# Patient Record
Sex: MALE | Race: WHITE | NOT HISPANIC OR LATINO
[De-identification: names, ages, dates, MRNs, and addresses within clinical notes are randomized per-mention and may not be internally consistent; named-entity substitution may affect disease eponyms.]

---

## 2017-01-20 ENCOUNTER — APPOINTMENT (OUTPATIENT)
Dept: WOUND CARE | Facility: CLINIC | Age: 72
End: 2017-01-20

## 2017-01-20 DIAGNOSIS — L89.153 PRESSURE ULCER OF SACRAL REGION, STAGE 3: ICD-10-CM

## 2017-01-20 DIAGNOSIS — G12.21 AMYOTROPHIC LATERAL SCLEROSIS: ICD-10-CM

## 2017-01-20 DIAGNOSIS — R32 UNSPECIFIED URINARY INCONTINENCE: ICD-10-CM

## 2017-01-20 DIAGNOSIS — Z74.01 BED CONFINEMENT STATUS: ICD-10-CM

## 2017-02-03 ENCOUNTER — APPOINTMENT (OUTPATIENT)
Dept: WOUND CARE | Facility: CLINIC | Age: 72
End: 2017-02-03

## 2017-02-21 ENCOUNTER — INPATIENT (INPATIENT)
Facility: HOSPITAL | Age: 72
LOS: 3 days | Discharge: HOME HEALTH | DRG: 689 | End: 2017-02-25
Attending: INTERNAL MEDICINE | Admitting: INTERNAL MEDICINE
Payer: MEDICARE

## 2017-02-21 VITALS
DIASTOLIC BLOOD PRESSURE: 87 MMHG | RESPIRATION RATE: 18 BRPM | HEART RATE: 111 BPM | SYSTOLIC BLOOD PRESSURE: 168 MMHG | OXYGEN SATURATION: 99 %

## 2017-02-21 DIAGNOSIS — N39.0 URINARY TRACT INFECTION, SITE NOT SPECIFIED: ICD-10-CM

## 2017-02-21 LAB
ALBUMIN SERPL ELPH-MCNC: 3.1 G/DL — LOW (ref 3.3–5)
ALP SERPL-CCNC: 77 U/L — SIGNIFICANT CHANGE UP (ref 40–120)
ALT FLD-CCNC: 15 U/L RC — SIGNIFICANT CHANGE UP (ref 10–45)
ANION GAP SERPL CALC-SCNC: 15 MMOL/L — SIGNIFICANT CHANGE UP (ref 5–17)
APPEARANCE UR: ABNORMAL
APTT BLD: 34.3 SEC — SIGNIFICANT CHANGE UP (ref 27.5–37.4)
AST SERPL-CCNC: 13 U/L — SIGNIFICANT CHANGE UP (ref 10–40)
BASOPHILS # BLD AUTO: 0 K/UL — SIGNIFICANT CHANGE UP (ref 0–0.2)
BASOPHILS NFR BLD AUTO: 0.3 % — SIGNIFICANT CHANGE UP (ref 0–2)
BILIRUB SERPL-MCNC: 0.2 MG/DL — SIGNIFICANT CHANGE UP (ref 0.2–1.2)
BILIRUB UR-MCNC: NEGATIVE — SIGNIFICANT CHANGE UP
BUN SERPL-MCNC: 20 MG/DL — SIGNIFICANT CHANGE UP (ref 7–23)
CALCIUM SERPL-MCNC: 9.4 MG/DL — SIGNIFICANT CHANGE UP (ref 8.4–10.5)
CHLORIDE SERPL-SCNC: 95 MMOL/L — LOW (ref 96–108)
CO2 SERPL-SCNC: 27 MMOL/L — SIGNIFICANT CHANGE UP (ref 22–31)
COLOR SPEC: YELLOW — SIGNIFICANT CHANGE UP
CREAT SERPL-MCNC: 0.79 MG/DL — SIGNIFICANT CHANGE UP (ref 0.5–1.3)
DIFF PNL FLD: ABNORMAL
EOSINOPHIL # BLD AUTO: 0.5 K/UL — SIGNIFICANT CHANGE UP (ref 0–0.5)
EOSINOPHIL NFR BLD AUTO: 3.8 % — SIGNIFICANT CHANGE UP (ref 0–6)
GAS PNL BLDV: SIGNIFICANT CHANGE UP
GLUCOSE SERPL-MCNC: 152 MG/DL — HIGH (ref 70–99)
GLUCOSE UR QL: NEGATIVE — SIGNIFICANT CHANGE UP
HCT VFR BLD CALC: 34.1 % — LOW (ref 39–50)
HGB BLD-MCNC: 10.8 G/DL — LOW (ref 13–17)
INR BLD: 1.29 RATIO — HIGH (ref 0.88–1.16)
KETONES UR-MCNC: NEGATIVE — SIGNIFICANT CHANGE UP
LEUKOCYTE ESTERASE UR-ACNC: ABNORMAL
LYMPHOCYTES # BLD AUTO: 1.6 K/UL — SIGNIFICANT CHANGE UP (ref 1–3.3)
LYMPHOCYTES # BLD AUTO: 13.3 % — SIGNIFICANT CHANGE UP (ref 13–44)
MCHC RBC-ENTMCNC: 24.9 PG — LOW (ref 27–34)
MCHC RBC-ENTMCNC: 31.7 GM/DL — LOW (ref 32–36)
MCV RBC AUTO: 78.7 FL — LOW (ref 80–100)
MONOCYTES # BLD AUTO: 1 K/UL — HIGH (ref 0–0.9)
MONOCYTES NFR BLD AUTO: 8.1 % — SIGNIFICANT CHANGE UP (ref 2–14)
NEUTROPHILS # BLD AUTO: 9.1 K/UL — HIGH (ref 1.8–7.4)
NEUTROPHILS NFR BLD AUTO: 74.5 % — SIGNIFICANT CHANGE UP (ref 43–77)
NITRITE UR-MCNC: POSITIVE
PH UR: 6 — SIGNIFICANT CHANGE UP (ref 4.8–8)
PLATELET # BLD AUTO: 336 K/UL — SIGNIFICANT CHANGE UP (ref 150–400)
POTASSIUM SERPL-MCNC: 4.2 MMOL/L — SIGNIFICANT CHANGE UP (ref 3.5–5.3)
POTASSIUM SERPL-SCNC: 4.2 MMOL/L — SIGNIFICANT CHANGE UP (ref 3.5–5.3)
PROT SERPL-MCNC: 8.2 G/DL — SIGNIFICANT CHANGE UP (ref 6–8.3)
PROT UR-MCNC: 100 MG/DL
PROTHROM AB SERPL-ACNC: 14 SEC — HIGH (ref 10–13.1)
RAPID RVP RESULT: SIGNIFICANT CHANGE UP
RBC # BLD: 4.33 M/UL — SIGNIFICANT CHANGE UP (ref 4.2–5.8)
RBC # FLD: 15.9 % — HIGH (ref 10.3–14.5)
SODIUM SERPL-SCNC: 137 MMOL/L — SIGNIFICANT CHANGE UP (ref 135–145)
SP GR SPEC: 1.01 — SIGNIFICANT CHANGE UP (ref 1.01–1.02)
UROBILINOGEN FLD QL: NEGATIVE — SIGNIFICANT CHANGE UP
WBC # BLD: 12.2 K/UL — HIGH (ref 3.8–10.5)
WBC # FLD AUTO: 12.2 K/UL — HIGH (ref 3.8–10.5)

## 2017-02-21 PROCEDURE — 93010 ELECTROCARDIOGRAM REPORT: CPT

## 2017-02-21 PROCEDURE — 99285 EMERGENCY DEPT VISIT HI MDM: CPT | Mod: 25

## 2017-02-21 PROCEDURE — 74177 CT ABD & PELVIS W/CONTRAST: CPT | Mod: 26

## 2017-02-21 PROCEDURE — 71010: CPT | Mod: 26

## 2017-02-21 PROCEDURE — 76700 US EXAM ABDOM COMPLETE: CPT | Mod: 26

## 2017-02-21 PROCEDURE — 76937 US GUIDE VASCULAR ACCESS: CPT | Mod: 26

## 2017-02-21 PROCEDURE — 36000 PLACE NEEDLE IN VEIN: CPT

## 2017-02-21 RX ORDER — SODIUM CHLORIDE 9 MG/ML
3 INJECTION INTRAMUSCULAR; INTRAVENOUS; SUBCUTANEOUS ONCE
Qty: 0 | Refills: 0 | Status: COMPLETED | OUTPATIENT
Start: 2017-02-21 | End: 2017-02-21

## 2017-02-21 RX ORDER — FAMOTIDINE 10 MG/ML
20 INJECTION INTRAVENOUS ONCE
Qty: 0 | Refills: 0 | Status: COMPLETED | OUTPATIENT
Start: 2017-02-21 | End: 2017-02-21

## 2017-02-21 RX ORDER — LIDOCAINE 4 G/100G
10 CREAM TOPICAL ONCE
Qty: 0 | Refills: 0 | Status: COMPLETED | OUTPATIENT
Start: 2017-02-21 | End: 2017-02-21

## 2017-02-21 RX ORDER — SODIUM CHLORIDE 9 MG/ML
500 INJECTION INTRAMUSCULAR; INTRAVENOUS; SUBCUTANEOUS ONCE
Qty: 0 | Refills: 0 | Status: COMPLETED | OUTPATIENT
Start: 2017-02-21 | End: 2017-02-21

## 2017-02-21 RX ORDER — CEFTRIAXONE 500 MG/1
1 INJECTION, POWDER, FOR SOLUTION INTRAMUSCULAR; INTRAVENOUS ONCE
Qty: 0 | Refills: 0 | Status: COMPLETED | OUTPATIENT
Start: 2017-02-21 | End: 2017-02-21

## 2017-02-21 RX ORDER — SODIUM CHLORIDE 9 MG/ML
1000 INJECTION INTRAMUSCULAR; INTRAVENOUS; SUBCUTANEOUS ONCE
Qty: 0 | Refills: 0 | Status: COMPLETED | OUTPATIENT
Start: 2017-02-21 | End: 2017-02-21

## 2017-02-21 RX ADMIN — SODIUM CHLORIDE 500 MILLILITER(S): 9 INJECTION INTRAMUSCULAR; INTRAVENOUS; SUBCUTANEOUS at 16:54

## 2017-02-21 RX ADMIN — SODIUM CHLORIDE 1000 MILLILITER(S): 9 INJECTION INTRAMUSCULAR; INTRAVENOUS; SUBCUTANEOUS at 20:04

## 2017-02-21 RX ADMIN — FAMOTIDINE 20 MILLIGRAM(S): 10 INJECTION INTRAVENOUS at 16:54

## 2017-02-21 RX ADMIN — SODIUM CHLORIDE 3 MILLILITER(S): 9 INJECTION INTRAMUSCULAR; INTRAVENOUS; SUBCUTANEOUS at 16:54

## 2017-02-21 RX ADMIN — Medication 30 MILLILITER(S): at 16:28

## 2017-02-21 RX ADMIN — LIDOCAINE 10 MILLILITER(S): 4 CREAM TOPICAL at 16:27

## 2017-02-21 RX ADMIN — CEFTRIAXONE 100 GRAM(S): 500 INJECTION, POWDER, FOR SOLUTION INTRAMUSCULAR; INTRAVENOUS at 19:38

## 2017-02-21 NOTE — ED ADULT NURSE NOTE - OBJECTIVE STATEMENT
Male 71 years old with history DM, HTN., and Multiple sclerosis was brought in by EMS from home for intermittent abdominal pain since November worse after eating. Pt is on Baclofen for MS but wife claimed that sometimes he refused to take it. Abdomen soft and non tender. Pt denies abdominal pain at this time. With [place] catheter attached to leg bag draining to cloudy urine. Buttocks, sacral and groin area very reddened and excoriated. With 6cm stage 3 at sacral area, blanching and with slight bleeding. Cleansed with saline and covered with  4x4. pt is quadriplegic. Has poor venous access. MD aware.

## 2017-02-21 NOTE — ED PROVIDER NOTE - SKIN, MLM
Large excoriations of b/l buttocks, blanching with stage III decubitus ulcer approx 5-6cm in diamtere

## 2017-02-21 NOTE — ED ADULT NURSE NOTE - PMH
DM (diabetes mellitus)    HTN (hypertension)    Incontinence, feces    Incontinent of urine    Multiple sclerosis    Pressure sore on sacrum    Quadriplegia

## 2017-02-21 NOTE — ED PROVIDER NOTE - PHYSICAL EXAMINATION
Attending Ferrer: Gen: nad, heent: atraumatic, eomi, perrla, mmm, neck: nttp, cv: rrr, soft murmur, lungs:Ctab, abd: soft, mild epigastric ttp, no rebound or guarding, ext: LE edema pitting, rectum, stage 3 decubitus ulcer with excoration and erythema, neuro: awake and following commands

## 2017-02-21 NOTE — ED PROVIDER NOTE - OBJECTIVE STATEMENT
71M hx of MS on prn baclofen (rarely used), HTN, CAD s/p stent x1, DM on glimepiride presents c/o generalized weakness associated with epigastric abdominal pain and intermittent subjective fever.  Pt and wife mention skin breakdown with sacral decubitus that occasional bleeds.  Pain worse with eating and described as "burning" that has been occurring intermittently x 3 months.  No nausea/vomiting, diarrhea/constipation. Chronic barton catheter. 71M hx of MS on prn baclofen (rarely used), HTN, CAD s/p stent x1, DM on glimepiride presents c/o generalized weakness associated with epigastric abdominal pain and intermittent subjective fever.  Pt and wife mention skin breakdown with sacral decubitus that occasional bleeds.  Pain worse with eating and described as "burning" that has been occurring intermittently x 3 months.  No nausea/vomiting, diarrhea/constipation. 71M hx of MS on prn baclofen (rarely used), HTN, CAD s/p stent x1, DM on glimepiride presents c/o generalized weakness associated with epigastric abdominal pain and intermittent subjective fever.  Pt and wife mention skin breakdown with sacral decubitus that occasional bleeds.  Pain worse with eating and described as "burning" that has been occurring intermittently x 3 months.  No nausea/vomiting, diarrhea/constipation.  Attending Ferrer: agree with above. additionally pt states pain in abdomen improved currently. no new medication. no recent abx use

## 2017-02-22 ENCOUNTER — TRANSCRIPTION ENCOUNTER (OUTPATIENT)
Age: 72
End: 2017-02-22

## 2017-02-22 ENCOUNTER — RESULT REVIEW (OUTPATIENT)
Age: 72
End: 2017-02-22

## 2017-02-22 DIAGNOSIS — L89.159 PRESSURE ULCER OF SACRAL REGION, UNSPECIFIED STAGE: ICD-10-CM

## 2017-02-22 DIAGNOSIS — E11.9 TYPE 2 DIABETES MELLITUS WITHOUT COMPLICATIONS: ICD-10-CM

## 2017-02-22 DIAGNOSIS — I82.90 ACUTE EMBOLISM AND THROMBOSIS OF UNSPECIFIED VEIN: ICD-10-CM

## 2017-02-22 DIAGNOSIS — N30.00 ACUTE CYSTITIS WITHOUT HEMATURIA: ICD-10-CM

## 2017-02-22 DIAGNOSIS — G35 MULTIPLE SCLEROSIS: ICD-10-CM

## 2017-02-22 DIAGNOSIS — R10.13 EPIGASTRIC PAIN: ICD-10-CM

## 2017-02-22 LAB
ANION GAP SERPL CALC-SCNC: 15 MMOL/L — SIGNIFICANT CHANGE UP (ref 5–17)
BASOPHILS # BLD AUTO: 0.02 K/UL — SIGNIFICANT CHANGE UP (ref 0–0.2)
BASOPHILS NFR BLD AUTO: 0.2 % — SIGNIFICANT CHANGE UP (ref 0–2)
BUN SERPL-MCNC: 14 MG/DL — SIGNIFICANT CHANGE UP (ref 7–23)
CALCIUM SERPL-MCNC: 8.5 MG/DL — SIGNIFICANT CHANGE UP (ref 8.4–10.5)
CHLORIDE SERPL-SCNC: 97 MMOL/L — SIGNIFICANT CHANGE UP (ref 96–108)
CO2 SERPL-SCNC: 24 MMOL/L — SIGNIFICANT CHANGE UP (ref 22–31)
CREAT SERPL-MCNC: 0.76 MG/DL — SIGNIFICANT CHANGE UP (ref 0.5–1.3)
EOSINOPHIL # BLD AUTO: 0.54 K/UL — HIGH (ref 0–0.5)
EOSINOPHIL NFR BLD AUTO: 5.4 % — SIGNIFICANT CHANGE UP (ref 0–6)
GLUCOSE SERPL-MCNC: 126 MG/DL — HIGH (ref 70–99)
HBA1C BLD-MCNC: 6.9 % — HIGH (ref 4–5.6)
HCT VFR BLD CALC: 29 % — LOW (ref 39–50)
HGB BLD-MCNC: 9.1 G/DL — LOW (ref 13–17)
IMM GRANULOCYTES NFR BLD AUTO: 0.3 % — SIGNIFICANT CHANGE UP (ref 0–1.5)
LYMPHOCYTES # BLD AUTO: 1.62 K/UL — SIGNIFICANT CHANGE UP (ref 1–3.3)
LYMPHOCYTES # BLD AUTO: 16.2 % — SIGNIFICANT CHANGE UP (ref 13–44)
MCHC RBC-ENTMCNC: 24.1 PG — LOW (ref 27–34)
MCHC RBC-ENTMCNC: 31.4 GM/DL — LOW (ref 32–36)
MCV RBC AUTO: 76.9 FL — LOW (ref 80–100)
MONOCYTES # BLD AUTO: 0.84 K/UL — SIGNIFICANT CHANGE UP (ref 0–0.9)
MONOCYTES NFR BLD AUTO: 8.4 % — SIGNIFICANT CHANGE UP (ref 2–14)
NEUTROPHILS # BLD AUTO: 6.98 K/UL — SIGNIFICANT CHANGE UP (ref 1.8–7.4)
NEUTROPHILS NFR BLD AUTO: 69.5 % — SIGNIFICANT CHANGE UP (ref 43–77)
PLATELET # BLD AUTO: 317 K/UL — SIGNIFICANT CHANGE UP (ref 150–400)
POTASSIUM SERPL-MCNC: 4 MMOL/L — SIGNIFICANT CHANGE UP (ref 3.5–5.3)
POTASSIUM SERPL-SCNC: 4 MMOL/L — SIGNIFICANT CHANGE UP (ref 3.5–5.3)
RBC # BLD: 3.77 M/UL — LOW (ref 4.2–5.8)
RBC # FLD: 16.6 % — HIGH (ref 10.3–14.5)
SODIUM SERPL-SCNC: 136 MMOL/L — SIGNIFICANT CHANGE UP (ref 135–145)
WBC # BLD: 10.03 K/UL — SIGNIFICANT CHANGE UP (ref 3.8–10.5)
WBC # FLD AUTO: 10.03 K/UL — SIGNIFICANT CHANGE UP (ref 3.8–10.5)

## 2017-02-22 PROCEDURE — 99232 SBSQ HOSP IP/OBS MODERATE 35: CPT

## 2017-02-22 PROCEDURE — 99223 1ST HOSP IP/OBS HIGH 75: CPT | Mod: AI

## 2017-02-22 RX ORDER — DEXTROSE 50 % IN WATER 50 %
12.5 SYRINGE (ML) INTRAVENOUS ONCE
Qty: 0 | Refills: 0 | Status: DISCONTINUED | OUTPATIENT
Start: 2017-02-22 | End: 2017-02-25

## 2017-02-22 RX ORDER — BACLOFEN 100 %
15 POWDER (GRAM) MISCELLANEOUS THREE TIMES A DAY
Qty: 0 | Refills: 0 | Status: DISCONTINUED | OUTPATIENT
Start: 2017-02-22 | End: 2017-02-25

## 2017-02-22 RX ORDER — POLYETHYLENE GLYCOL 3350 17 G/17G
17 POWDER, FOR SOLUTION ORAL DAILY
Qty: 0 | Refills: 0 | Status: DISCONTINUED | OUTPATIENT
Start: 2017-02-22 | End: 2017-02-25

## 2017-02-22 RX ORDER — ASCORBIC ACID 60 MG
500 TABLET,CHEWABLE ORAL DAILY
Qty: 0 | Refills: 0 | Status: DISCONTINUED | OUTPATIENT
Start: 2017-02-22 | End: 2017-02-25

## 2017-02-22 RX ORDER — ASPIRIN/CALCIUM CARB/MAGNESIUM 324 MG
81 TABLET ORAL DAILY
Qty: 0 | Refills: 0 | Status: DISCONTINUED | OUTPATIENT
Start: 2017-02-22 | End: 2017-02-25

## 2017-02-22 RX ORDER — CEFTRIAXONE 500 MG/1
1 INJECTION, POWDER, FOR SOLUTION INTRAMUSCULAR; INTRAVENOUS EVERY 24 HOURS
Qty: 0 | Refills: 0 | Status: DISCONTINUED | OUTPATIENT
Start: 2017-02-22 | End: 2017-02-24

## 2017-02-22 RX ORDER — SODIUM CHLORIDE 9 MG/ML
1000 INJECTION, SOLUTION INTRAVENOUS
Qty: 0 | Refills: 0 | Status: DISCONTINUED | OUTPATIENT
Start: 2017-02-22 | End: 2017-02-25

## 2017-02-22 RX ORDER — GLUCAGON INJECTION, SOLUTION 0.5 MG/.1ML
1 INJECTION, SOLUTION SUBCUTANEOUS ONCE
Qty: 0 | Refills: 0 | Status: DISCONTINUED | OUTPATIENT
Start: 2017-02-22 | End: 2017-02-25

## 2017-02-22 RX ORDER — ACETAMINOPHEN 500 MG
650 TABLET ORAL EVERY 6 HOURS
Qty: 0 | Refills: 0 | Status: DISCONTINUED | OUTPATIENT
Start: 2017-02-22 | End: 2017-02-25

## 2017-02-22 RX ORDER — CHOLECALCIFEROL (VITAMIN D3) 125 MCG
1000 CAPSULE ORAL DAILY
Qty: 0 | Refills: 0 | Status: DISCONTINUED | OUTPATIENT
Start: 2017-02-22 | End: 2017-02-25

## 2017-02-22 RX ORDER — LISINOPRIL 2.5 MG/1
2.5 TABLET ORAL DAILY
Qty: 0 | Refills: 0 | Status: DISCONTINUED | OUTPATIENT
Start: 2017-02-22 | End: 2017-02-25

## 2017-02-22 RX ORDER — SENNA PLUS 8.6 MG/1
2 TABLET ORAL AT BEDTIME
Qty: 0 | Refills: 0 | Status: DISCONTINUED | OUTPATIENT
Start: 2017-02-22 | End: 2017-02-25

## 2017-02-22 RX ORDER — PANTOPRAZOLE SODIUM 20 MG/1
40 TABLET, DELAYED RELEASE ORAL
Qty: 0 | Refills: 0 | Status: DISCONTINUED | OUTPATIENT
Start: 2017-02-22 | End: 2017-02-25

## 2017-02-22 RX ORDER — METOPROLOL TARTRATE 50 MG
25 TABLET ORAL
Qty: 0 | Refills: 0 | Status: DISCONTINUED | OUTPATIENT
Start: 2017-02-22 | End: 2017-02-25

## 2017-02-22 RX ORDER — INSULIN LISPRO 100/ML
VIAL (ML) SUBCUTANEOUS
Qty: 0 | Refills: 0 | Status: DISCONTINUED | OUTPATIENT
Start: 2017-02-22 | End: 2017-02-25

## 2017-02-22 RX ORDER — ENOXAPARIN SODIUM 100 MG/ML
40 INJECTION SUBCUTANEOUS EVERY 24 HOURS
Qty: 0 | Refills: 0 | Status: DISCONTINUED | OUTPATIENT
Start: 2017-02-22 | End: 2017-02-25

## 2017-02-22 RX ORDER — DOCUSATE SODIUM 100 MG
100 CAPSULE ORAL THREE TIMES A DAY
Qty: 0 | Refills: 0 | Status: DISCONTINUED | OUTPATIENT
Start: 2017-02-22 | End: 2017-02-25

## 2017-02-22 RX ORDER — DEXTROSE 50 % IN WATER 50 %
1 SYRINGE (ML) INTRAVENOUS ONCE
Qty: 0 | Refills: 0 | Status: DISCONTINUED | OUTPATIENT
Start: 2017-02-22 | End: 2017-02-25

## 2017-02-22 RX ADMIN — Medication 1000 UNIT(S): at 12:17

## 2017-02-22 RX ADMIN — Medication 25 MILLIGRAM(S): at 06:34

## 2017-02-22 RX ADMIN — PANTOPRAZOLE SODIUM 40 MILLIGRAM(S): 20 TABLET, DELAYED RELEASE ORAL at 06:34

## 2017-02-22 RX ADMIN — Medication 100 MILLIGRAM(S): at 06:34

## 2017-02-22 RX ADMIN — Medication 500 MILLIGRAM(S): at 12:17

## 2017-02-22 RX ADMIN — Medication 1: at 17:44

## 2017-02-22 RX ADMIN — Medication 25 MILLIGRAM(S): at 17:45

## 2017-02-22 RX ADMIN — CEFTRIAXONE 100 GRAM(S): 500 INJECTION, POWDER, FOR SOLUTION INTRAMUSCULAR; INTRAVENOUS at 22:51

## 2017-02-22 RX ADMIN — SENNA PLUS 2 TABLET(S): 8.6 TABLET ORAL at 22:51

## 2017-02-22 RX ADMIN — Medication 1 TABLET(S): at 12:17

## 2017-02-22 RX ADMIN — Medication 100 MILLIGRAM(S): at 22:51

## 2017-02-22 RX ADMIN — Medication 100 MILLIGRAM(S): at 16:37

## 2017-02-22 RX ADMIN — LISINOPRIL 2.5 MILLIGRAM(S): 2.5 TABLET ORAL at 06:34

## 2017-02-22 RX ADMIN — ENOXAPARIN SODIUM 40 MILLIGRAM(S): 100 INJECTION SUBCUTANEOUS at 12:19

## 2017-02-22 RX ADMIN — Medication 15 MILLIGRAM(S): at 22:51

## 2017-02-22 RX ADMIN — Medication 81 MILLIGRAM(S): at 12:17

## 2017-02-22 RX ADMIN — POLYETHYLENE GLYCOL 3350 17 GRAM(S): 17 POWDER, FOR SOLUTION ORAL at 12:16

## 2017-02-22 NOTE — DISCHARGE NOTE ADULT - HOSPITAL COURSE
attending to write 71M hx MS, htn, cad s/p pci, dm2 p/w generalized weakness, abdominal pain, subjective fever.    Dx: Acute cystitis /uti        Post prandial abdominal pain, likely dyspepsia, r/o PUD 71M hx MS, htn, cad s/p pci, dm2 p/w generalized weakness, abdominal pain, subjective fever.    Dx: Acute cystitis /uti        Post prandial abdominal pain, likely dyspepsia, r/o PUD  2/21 RVP negative         UA +  UTI   Ceftriaxone         CT A/P - Ascending right-sided urinary tract infection with mild associated right  hydroureteronephrosis.   1 cm cystic nodule within the body of the pancreas demonstrating minimal  if any interval increase in size since November 13, 2016. Dedicated abdominal MRI can be obtained for further evaluation. Prominent bilateral inguinal lymph nodes. Ultrasound can be performed for further evaluation.  2/22    Severe IAD, as per wound care team only Franco and triad cream for now(no nystatin)  2/23    S/P endoscopy, only gastritis             2/24     US done to re evaluate R hydro, resolved. DC Hirsch, apply condom cath              Ambulance was arranged by CM for tomorrows Pickup at 2 pm              DC home with PO cipro--

## 2017-02-22 NOTE — H&P ADULT. - PROBLEM SELECTOR PROBLEM 4
Prophylactic use of unfractionated heparin for venous thromboembolism Type 2 diabetes mellitus without complication, without long-term current use of insulin

## 2017-02-22 NOTE — DISCHARGE NOTE ADULT - PLAN OF CARE
[facility] CARE INSTRUCTIONS  f you were prescribed antibiotics, take them exactly as your caregiver instructs you. Finish the medication even if you feel better after you have only taken some of the medication.  Drink enough water and fluids to keep your urine clear or pale yellow.  Avoid caffeine, tea, and carbonated beverages. They tend to irritate your bladder.  Empty your bladder often. Avoid holding urine for long periods of time.  Empty your bladder before and after sexual intercourse.  After a bowel movement, women should cleanse from front to back. Use each tissue only once.  SEEK MEDICAL CARE IF:  You have back pain.  You develop a fever.  Your symptoms do not begin to resolve within 3 days.  SEEK IMMEDIATE MEDICAL CARE IF:  You have severe back pain or lower abdominal pain.  You develop chills.  You have nausea or vomiting.  You have continued burning or discomfort with urination. Right hydronephrosis was noted on us, repeat renal US done on 2/24 showed resolution. Hirsch catheter was discontinued. F/U with Urology Endoscopy showed gastritis Continue with your neurologist HgA1C this admission.  Make sure you get your HgA1c checked every three months.  If you take oral diabetes medications, check your blood glucose two times a day.  If you take insulin, check your blood glucose before meals and at bedtime.  It's important not to skip any meals.  Keep a log of your blood glucose results and always take it with you to your doctor appointments.  Keep a list of your current medications including injectables and over the counter medications and bring this medication list with you to all your doctor appointments.  If you have not seen your ophthalmologist this year call for appointment.  Check your feet daily for redness, sores, or openings. Do not self treat. If no improvement in two days call your primary care physician for an appointment.  Low blood sugar (hypoglycemia) is a blood sugar below 70mg/dl. Check your blood sugar if you feel signs/symptoms of hypoglycemia. If your blood sugar is below 70 take 15 grams of carbohydrates (ex 4 oz of apple juice, 3-4 glucose tablets, or 4-6 oz of regular soda) wait 15 minutes and repeat blood sugar to make sure it comes up above 70.  If your blood sugar is above 70 and you are due for a meal, have a meal.  If you are not due for a meal have a snack.  This snack helps keeps your blood sugar at a safe range. Endoscopy showed gastritis no H-pylori continue protonix and follow up with Dr Pat for management of chronic gastritis. Continue with your neurologist as outpatient , You were seen by Dr La neurologist. Telephone number for appointment if needed is listed below HgA1C this admission.  Make sure you get your HgA1c checked every three months.  If you take oral diabetes medications, check your blood glucose two times a day.  If you take insulin, check your blood glucose before meals and at bedtime.  It's important not to skip any meals.  Keep a log of your blood glucose results and always take it with you to your doctor appointments.  Keep a list of your current medications including injectables and over the counter medications and bring this medication list with you to all your doctor appointments.  If you have not seen your ophthalmologist this year call for appointment.  Check your feet daily for redness, sores, or openings. Do not self treat. If no improvement in two days call your primary care physician for an appointment.  Low blood sugar (hypoglycemia) is a blood sugar below 70mg/dl. Check your blood sugar if you feel signs/symptoms of hypoglycemia. If your blood sugar is below 70 take 15 grams of carbohydrates (ex 4 oz of apple juice, 3-4 glucose tablets, or 4-6 oz of regular soda) wait 15 minutes and repeat blood sugar to make sure it comes up above 70.  If your blood sugar is above 70 and you are due for a meal, have a meal.  If you are not due for a meal have a snack.  This snack helps keeps your blood sugar at a safe range. You were seen by Moises endocrinologist in the hospital for further outpatient care , please call for an appointment as listed below Right hydronephrosis was noted on us, repeat renal US done on 2/24 showed resolution. Hirsch catheter was discontinued. F/U with Urology ( Dr. Marquez)  condom catheter outpatient

## 2017-02-22 NOTE — H&P ADULT. - HISTORY OF PRESENT ILLNESS
71M hx MS, htn, cad s/p pci, dm2 p/w generalized weakness, abdominal pain, subjective fever.    ED VS: Tmax: 99.7, BP: 157-177/69-91, P: 100-107, R: 18, O2: 97-98% on RA  ED meds: ceftriaxone 1g iv, Ns x 1.5L, pepcid, maalox, viscous lidocaine Nighttime hospitalist, patient not previously known to me  Seen and examined on 2/21/17 at 1120pm    71M hx MS, htn, cad s/p pci, dm2 p/w generalized weakness, abdominal pain. history obtained from patient and wife. for the past 2-3 days the patient has experienced generalized weakness and decreased urination. he spiked a fever of 100.4 earlier today as well. denies cough, cp, sob, diarrhea, nausea, vomiting, chills, myalgias, congestion. also notes long history of abdominal pain and bloating related to eating solid foods. these symptoms have been occurring for many months and he has not seen a physician for these issues since it is hard to get out of the house. the abdominal pain occurs with all foods and typically occurs after eating along with bloating. denies melena, hematochezia, regular nsaid use, fhx gi cancer, dysphagia/odynaphagia. he had a colonoscopy 3 years ago which was normal per the patient however he has never had an endoscopy.    ED VS: Tmax: 99.7, BP: 157-177/69-91, P: 100-107, R: 18, O2: 97-98% on RA  ED meds: ceftriaxone 1g iv, Ns x 1.5L, pepcid, maalox, viscous lidocaine Nighttime hospitalist, patient not previously known to me    71M hx MS, htn, cad s/p pci, dm2 p/w generalized weakness, abdominal pain. history obtained from patient and wife. for the past 2-3 days the patient has experienced generalized weakness and decreased urination. he spiked a fever of 100.4 earlier today as well. denies cough, cp, sob, diarrhea, nausea, vomiting, chills, myalgias, congestion. also notes long history of abdominal pain and bloating related to eating solid foods. these symptoms have been occurring for many months and he has not seen a physician for these issues since it is hard to get out of the house. the abdominal pain occurs with all foods and typically occurs after eating along with bloating. denies melena, hematochezia, regular nsaid use, fhx gi cancer, dysphagia/odynaphagia. he had a colonoscopy 3 years ago which was normal per the patient however he has never had an endoscopy.    ED VS: Tmax: 99.7, BP: 157-177/69-91, P: 100-107, R: 18, O2: 97-98% on RA  ED meds: ceftriaxone 1g iv, Ns x 1.5L, pepcid, maalox, viscous lidocaine

## 2017-02-22 NOTE — DISCHARGE NOTE ADULT - PATIENT PORTAL LINK FT
“You can access the [employer] Patient Portal, offered by [facility], by registering with the following website: [email or web address]”

## 2017-02-22 NOTE — H&P ADULT. - RADIOLOGY RESULTS AND INTERPRETATION
CT A/P: CXR personally reviewed: Left lateral basilar opacity may represent trace left pleural effusion or atelectasis  CT A/P reviewed: Ascending right-sided urinary tract infection with mild associated right hydroureteronephrosis. 1 cm cystic nodule within the body of the pancreas demonstrating minimal  if any interval increase in size since November 13, 2016. Dedicated abdominal MRI can be obtained for further evaluation. Prominent bilateral inguinal lymph nodes. Ultrasound can be performed for  further evaluation.  RUQ sono reviewed: Cholelithiasis with negative sonographic Fernando's. Evaluation for   wall thickening and pericholecystic fluid is limited by acoustic  shadowing from gallstones. If clinical concern persists, HIDA scan can be  obtained for further evaluation.

## 2017-02-22 NOTE — H&P ADULT. - PROBLEM SELECTOR PLAN 6
lovenox  verify home meds in am lovenox  verify home meds in am    #Coronary artery disease  -self discontinued plavix in december  -c/w aspirin  -intolerance to lipitor, no longer taking  -requires outpt cardiology follow up

## 2017-02-22 NOTE — DISCHARGE NOTE ADULT - SECONDARY DIAGNOSIS.
Urinary retention Dyspepsia Multiple sclerosis Type 2 diabetes mellitus without complication, without long-term current use of insulin

## 2017-02-22 NOTE — DISCHARGE NOTE ADULT - PROVIDER TOKENS
TOKEN:'[id number]',TOKEN:'[id number]',TOKEN:'[id number]' TOKEN:'[id number]',TOKEN:'[id number]:'[id number]',TOKEN:'[id number]'

## 2017-02-22 NOTE — DISCHARGE NOTE ADULT - HOME CARE AGENCY
[place] [phone]. A visiting nurse will be visiting you in your home after you are discharged from the hospital.

## 2017-02-22 NOTE — DISCHARGE NOTE ADULT - CARE PROVIDERS DIRECT ADDRESSES
,DirectAddress_Unknown,DirectAddress_Unknown,DirectAddress_Unknown,DirectAddress_Unknown ,DirectAddress_Unknown,DirectAddress_Unknown,DirectAddress_Unknown,[email or web address],DirectAddress_Unknown

## 2017-02-22 NOTE — DISCHARGE NOTE ADULT - CARE PROVIDER_API CALL
Markus Zarco), Internal Medicine  [address]  [place], [place] [id number]  Phone: [phone]  Fax: [phone]    Selvin Pat), Medicine  [address] [place] [address]  Phone: [phone]  Fax: [phone]    Radha La), Neurology  [address] [place] [address]  Phone: [phone]  Fax: [phone] Markus Zarco), Internal Medicine  [address]  [place], [place] [id number]  Phone: [phone]  Fax: [phone]    Selvin Pat (MD), Medicine  [address] [place] [address]  Phone: [phone]  Fax: [phone]    Radha La (MD), Neurology  [address] [place] [address]  Phone: [phone]  Fax: [phone]    Selvin Marquez), Urology  [address]  [place] [address]  Phone: [phone]  Fax: [phone]

## 2017-02-22 NOTE — H&P ADULT. - PROBLEM SELECTOR PLAN 2
post-prandial abd pain, bloating likely 2/2 dyspepsia. r/o pud.  -start protonix daily  -GI consult to eval for egd

## 2017-02-22 NOTE — DISCHARGE NOTE ADULT - CARE PLAN
Principal Discharge DX:	UTI (urinary tract infection)  Secondary Diagnosis:	Urinary retention  Secondary Diagnosis:	Dyspepsia  Secondary Diagnosis:	Multiple sclerosis  Secondary Diagnosis:	Type 2 diabetes mellitus without complication, without long-term current use of insulin Principal Discharge DX:	UTI (urinary tract infection)  Goal:	Resolution  Instructions for follow-up, activity and diet:	HOME CARE INSTRUCTIONS  f you were prescribed antibiotics, take them exactly as your caregiver instructs you. Finish the medication even if you feel better after you have only taken some of the medication.  Drink enough water and fluids to keep your urine clear or pale yellow.  Avoid caffeine, tea, and carbonated beverages. They tend to irritate your bladder.  Empty your bladder often. Avoid holding urine for long periods of time.  Empty your bladder before and after sexual intercourse.  After a bowel movement, women should cleanse from front to back. Use each tissue only once.  SEEK MEDICAL CARE IF:  You have back pain.  You develop a fever.  Your symptoms do not begin to resolve within 3 days.  SEEK IMMEDIATE MEDICAL CARE IF:  You have severe back pain or lower abdominal pain.  You develop chills.  You have nausea or vomiting.  You have continued burning or discomfort with urination.  Secondary Diagnosis:	Urinary retention  Secondary Diagnosis:	Dyspepsia  Secondary Diagnosis:	Multiple sclerosis  Secondary Diagnosis:	Type 2 diabetes mellitus without complication, without long-term current use of insulin Principal Discharge DX:	UTI (urinary tract infection)  Goal:	Resolution  Instructions for follow-up, activity and diet:	HOME CARE INSTRUCTIONS  f you were prescribed antibiotics, take them exactly as your caregiver instructs you. Finish the medication even if you feel better after you have only taken some of the medication.  Drink enough water and fluids to keep your urine clear or pale yellow.  Avoid caffeine, tea, and carbonated beverages. They tend to irritate your bladder.  Empty your bladder often. Avoid holding urine for long periods of time.  Empty your bladder before and after sexual intercourse.  After a bowel movement, women should cleanse from front to back. Use each tissue only once.  SEEK MEDICAL CARE IF:  You have back pain.  You develop a fever.  Your symptoms do not begin to resolve within 3 days.  SEEK IMMEDIATE MEDICAL CARE IF:  You have severe back pain or lower abdominal pain.  You develop chills.  You have nausea or vomiting.  You have continued burning or discomfort with urination.  Secondary Diagnosis:	Urinary retention  Instructions for follow-up, activity and diet:	Right hydronephrosis was noted on us, repeat renal US done on 2/24 showed resolution. Hirsch catheter was discontinued. F/U with Urology  Secondary Diagnosis:	Dyspepsia  Instructions for follow-up, activity and diet:	Endoscopy showed gastritis  Secondary Diagnosis:	Multiple sclerosis  Instructions for follow-up, activity and diet:	Continue with your neurologist  Secondary Diagnosis:	Type 2 diabetes mellitus without complication, without long-term current use of insulin  Instructions for follow-up, activity and diet:	HgA1C this admission.  Make sure you get your HgA1c checked every three months.  If you take oral diabetes medications, check your blood glucose two times a day.  If you take insulin, check your blood glucose before meals and at bedtime.  It's important not to skip any meals.  Keep a log of your blood glucose results and always take it with you to your doctor appointments.  Keep a list of your current medications including injectables and over the counter medications and bring this medication list with you to all your doctor appointments.  If you have not seen your ophthalmologist this year call for appointment.  Check your feet daily for redness, sores, or openings. Do not self treat. If no improvement in two days call your primary care physician for an appointment.  Low blood sugar (hypoglycemia) is a blood sugar below 70mg/dl. Check your blood sugar if you feel signs/symptoms of hypoglycemia. If your blood sugar is below 70 take 15 grams of carbohydrates (ex 4 oz of apple juice, 3-4 glucose tablets, or 4-6 oz of regular soda) wait 15 minutes and repeat blood sugar to make sure it comes up above 70.  If your blood sugar is above 70 and you are due for a meal, have a meal.  If you are not due for a meal have a snack.  This snack helps keeps your blood sugar at a safe range. Principal Discharge DX:	UTI (urinary tract infection)  Goal:	Resolution  Instructions for follow-up, activity and diet:	HOME CARE INSTRUCTIONS  f you were prescribed antibiotics, take them exactly as your caregiver instructs you. Finish the medication even if you feel better after you have only taken some of the medication.  Drink enough water and fluids to keep your urine clear or pale yellow.  Avoid caffeine, tea, and carbonated beverages. They tend to irritate your bladder.  Empty your bladder often. Avoid holding urine for long periods of time.  Empty your bladder before and after sexual intercourse.  After a bowel movement, women should cleanse from front to back. Use each tissue only once.  SEEK MEDICAL CARE IF:  You have back pain.  You develop a fever.  Your symptoms do not begin to resolve within 3 days.  SEEK IMMEDIATE MEDICAL CARE IF:  You have severe back pain or lower abdominal pain.  You develop chills.  You have nausea or vomiting.  You have continued burning or discomfort with urination.  Secondary Diagnosis:	Urinary retention  Instructions for follow-up, activity and diet:	Right hydronephrosis was noted on us, repeat renal US done on 2/24 showed resolution. Hirsch catheter was discontinued. F/U with Urology  Secondary Diagnosis:	Dyspepsia  Instructions for follow-up, activity and diet:	Endoscopy showed gastritis no H-pylori continue protonix and follow up with Dr Pat for management of chronic gastritis.  Secondary Diagnosis:	Multiple sclerosis  Instructions for follow-up, activity and diet:	Continue with your neurologist as outpatient , You were seen by Dr La neurologist. Telephone number for appointment if needed is listed below  Secondary Diagnosis:	Type 2 diabetes mellitus without complication, without long-term current use of insulin  Instructions for follow-up, activity and diet:	HgA1C this admission.  Make sure you get your HgA1c checked every three months.  If you take oral diabetes medications, check your blood glucose two times a day.  If you take insulin, check your blood glucose before meals and at bedtime.  It's important not to skip any meals.  Keep a log of your blood glucose results and always take it with you to your doctor appointments.  Keep a list of your current medications including injectables and over the counter medications and bring this medication list with you to all your doctor appointments.  If you have not seen your ophthalmologist this year call for appointment.  Check your feet daily for redness, sores, or openings. Do not self treat. If no improvement in two days call your primary care physician for an appointment.  Low blood sugar (hypoglycemia) is a blood sugar below 70mg/dl. Check your blood sugar if you feel signs/symptoms of hypoglycemia. If your blood sugar is below 70 take 15 grams of carbohydrates (ex 4 oz of apple juice, 3-4 glucose tablets, or 4-6 oz of regular soda) wait 15 minutes and repeat blood sugar to make sure it comes up above 70.  If your blood sugar is above 70 and you are due for a meal, have a meal.  If you are not due for a meal have a snack.  This snack helps keeps your blood sugar at a safe range. You were seen by [facility] endocrinologist in the hospital for further outpatient care , please call for an appointment as listed below Principal Discharge DX:	UTI (urinary tract infection)  Goal:	Resolution  Instructions for follow-up, activity and diet:	HOME CARE INSTRUCTIONS  f you were prescribed antibiotics, take them exactly as your caregiver instructs you. Finish the medication even if you feel better after you have only taken some of the medication.  Drink enough water and fluids to keep your urine clear or pale yellow.  Avoid caffeine, tea, and carbonated beverages. They tend to irritate your bladder.  Empty your bladder often. Avoid holding urine for long periods of time.  Empty your bladder before and after sexual intercourse.  After a bowel movement, women should cleanse from front to back. Use each tissue only once.  SEEK MEDICAL CARE IF:  You have back pain.  You develop a fever.  Your symptoms do not begin to resolve within 3 days.  SEEK IMMEDIATE MEDICAL CARE IF:  You have severe back pain or lower abdominal pain.  You develop chills.  You have nausea or vomiting.  You have continued burning or discomfort with urination.  Secondary Diagnosis:	Urinary retention  Instructions for follow-up, activity and diet:	Right hydronephrosis was noted on us, repeat renal US done on 2/24 showed resolution. Hirsch catheter was discontinued. F/U with Urology ( Dr. Marquez)  condom catheter outpatient  Secondary Diagnosis:	Dyspepsia  Instructions for follow-up, activity and diet:	Endoscopy showed gastritis no H-pylori continue protonix and follow up with Dr Pat for management of chronic gastritis.  Secondary Diagnosis:	Multiple sclerosis  Instructions for follow-up, activity and diet:	Continue with your neurologist as outpatient , You were seen by Dr La neurologist. Telephone number for appointment if needed is listed below  Secondary Diagnosis:	Type 2 diabetes mellitus without complication, without long-term current use of insulin  Instructions for follow-up, activity and diet:	HgA1C this admission.  Make sure you get your HgA1c checked every three months.  If you take oral diabetes medications, check your blood glucose two times a day.  If you take insulin, check your blood glucose before meals and at bedtime.  It's important not to skip any meals.  Keep a log of your blood glucose results and always take it with you to your doctor appointments.  Keep a list of your current medications including injectables and over the counter medications and bring this medication list with you to all your doctor appointments.  If you have not seen your ophthalmologist this year call for appointment.  Check your feet daily for redness, sores, or openings. Do not self treat. If no improvement in two days call your primary care physician for an appointment.  Low blood sugar (hypoglycemia) is a blood sugar below 70mg/dl. Check your blood sugar if you feel signs/symptoms of hypoglycemia. If your blood sugar is below 70 take 15 grams of carbohydrates (ex 4 oz of apple juice, 3-4 glucose tablets, or 4-6 oz of regular soda) wait 15 minutes and repeat blood sugar to make sure it comes up above 70.  If your blood sugar is above 70 and you are due for a meal, have a meal.  If you are not due for a meal have a snack.  This snack helps keeps your blood sugar at a safe range. You were seen by [facility] endocrinologist in the hospital for further outpatient care , please call for an appointment as listed below

## 2017-02-22 NOTE — DISCHARGE NOTE ADULT - MEDICATION SUMMARY - MEDICATIONS TO STOP TAKING
I will STOP taking the medications listed below when I get home from the hospital:    Lasix 40 mg oral tablet  -- 1 tab(s) by mouth once a day

## 2017-02-22 NOTE — DISCHARGE NOTE ADULT - MEDICATION SUMMARY - MEDICATIONS TO TAKE
I will START or STAY ON the medications listed below when I get home from the hospital:    aquacel  -- Apply on skin to affected area once a day (or when soiled)  -- Indication: For Decubitus ulcer of sacral region, unspecified ulcer stage    Gel overlay to mattress of hospital bed  -- Apply on skin to affected area once a day    ICD   -- Indication: For Decubitus ulcer of sacral region, unspecified ulcer stage    aspirin 81 mg oral delayed release tablet  -- 1 tab(s) by mouth once a day  -- Indication: For cad    lisinopril 2.5 mg oral tablet  -- 1 tab(s) by mouth once a day  -- Indication: For htn    glimepiride 1 mg oral tablet  -- 1 tab(s) by mouth once a day  -- Indication: For Diabetes type 2    Metoprolol Tartrate 25 mg oral tablet  -- 1 tab(s) by mouth 2 times a day  -- Indication: For htn    polyethylene glycol 3350 oral powder for reconstitution  -- 17 gram(s) by mouth once a day  -- Indication: For constipation    senna oral tablet  -- 2 tab(s) by mouth once a day (at bedtime)  -- Indication: For constipation (over the counter medication)    baclofen 20 mg oral tablet  -- 1 tab(s) by mouth 3 times a day, As Needed  -- takes at bedtime daily and throughout the day as needed.   -- Indication: For spasms    ciprofloxacin 250 mg oral tablet  -- 1 tab(s) by mouth 2 times a day  -- Indication: For UTI (urinary tract infection)    Multiple Vitamins oral tablet  -- 1 tab(s) by mouth once a day  -- Indication: For supplement    ascorbic acid 500 mg oral tablet  -- 1 tab(s) by mouth once a day  -- Indication: For supplement    cholecalciferol oral tablet  -- 1000 unit(s) by mouth once a day  -- Indication: For supplement

## 2017-02-22 NOTE — H&P ADULT. - FAMILY HISTORY
<<-----Click on this checkbox to enter Family History Family history of colon cancer in mother     Father  Still living? Unknown  Family history of cerebrovascular accident (CVA) in father, Age at diagnosis: Age Unknown

## 2017-02-22 NOTE — H&P ADULT. - RS GEN PE MLT RESP DETAILS PC
respirations non-labored/clear to auscultation bilaterally/airway patent/good air movement/breath sounds equal

## 2017-02-22 NOTE — H&P ADULT. - PROBLEM SELECTOR PLAN 1
+UA a/w weakness, dec UOP.  -ceftriaxone 1g iv qd  -f/u ucx  -remove barton +UA a/w weakness, dec UOP. R hydronephrosis noted on CT and US.  -ceftriaxone 1g iv qd  -f/u ucx  -barton catheter in place

## 2017-02-23 LAB
-  AMIKACIN: SIGNIFICANT CHANGE UP
-  AMPICILLIN/SULBACTAM: SIGNIFICANT CHANGE UP
-  AMPICILLIN: SIGNIFICANT CHANGE UP
-  AZTREONAM: SIGNIFICANT CHANGE UP
-  CEFAZOLIN: SIGNIFICANT CHANGE UP
-  CEFEPIME: SIGNIFICANT CHANGE UP
-  CEFOXITIN: SIGNIFICANT CHANGE UP
-  CEFTAZIDIME: SIGNIFICANT CHANGE UP
-  CEFTRIAXONE: SIGNIFICANT CHANGE UP
-  CIPROFLOXACIN: SIGNIFICANT CHANGE UP
-  ERTAPENEM: SIGNIFICANT CHANGE UP
-  GENTAMICIN: SIGNIFICANT CHANGE UP
-  IMIPENEM: SIGNIFICANT CHANGE UP
-  LEVOFLOXACIN: SIGNIFICANT CHANGE UP
-  MEROPENEM: SIGNIFICANT CHANGE UP
-  NITROFURANTOIN: SIGNIFICANT CHANGE UP
-  PIPERACILLIN/TAZOBACTAM: SIGNIFICANT CHANGE UP
-  TOBRAMYCIN: SIGNIFICANT CHANGE UP
-  TRIMETHOPRIM/SULFAMETHOXAZOLE: SIGNIFICANT CHANGE UP
ANION GAP SERPL CALC-SCNC: 10 MMOL/L — SIGNIFICANT CHANGE UP (ref 5–17)
BUN SERPL-MCNC: 12 MG/DL — SIGNIFICANT CHANGE UP (ref 7–23)
CALCIUM SERPL-MCNC: 8.6 MG/DL — SIGNIFICANT CHANGE UP (ref 8.4–10.5)
CHLORIDE SERPL-SCNC: 100 MMOL/L — SIGNIFICANT CHANGE UP (ref 96–108)
CO2 SERPL-SCNC: 28 MMOL/L — SIGNIFICANT CHANGE UP (ref 22–31)
CREAT SERPL-MCNC: 0.7 MG/DL — SIGNIFICANT CHANGE UP (ref 0.5–1.3)
CULTURE RESULTS: SIGNIFICANT CHANGE UP
GLUCOSE SERPL-MCNC: 122 MG/DL — HIGH (ref 70–99)
HCT VFR BLD CALC: 31.1 % — LOW (ref 39–50)
HGB BLD-MCNC: 9.4 G/DL — LOW (ref 13–17)
MCHC RBC-ENTMCNC: 23.8 PG — LOW (ref 27–34)
MCHC RBC-ENTMCNC: 30.2 GM/DL — LOW (ref 32–36)
MCV RBC AUTO: 78.7 FL — LOW (ref 80–100)
METHOD TYPE: SIGNIFICANT CHANGE UP
ORGANISM # SPEC MICROSCOPIC CNT: SIGNIFICANT CHANGE UP
ORGANISM # SPEC MICROSCOPIC CNT: SIGNIFICANT CHANGE UP
PLATELET # BLD AUTO: 351 K/UL — SIGNIFICANT CHANGE UP (ref 150–400)
POTASSIUM SERPL-MCNC: 3.8 MMOL/L — SIGNIFICANT CHANGE UP (ref 3.5–5.3)
POTASSIUM SERPL-SCNC: 3.8 MMOL/L — SIGNIFICANT CHANGE UP (ref 3.5–5.3)
RBC # BLD: 3.95 M/UL — LOW (ref 4.2–5.8)
RBC # FLD: 16.7 % — HIGH (ref 10.3–14.5)
SODIUM SERPL-SCNC: 137 MMOL/L — SIGNIFICANT CHANGE UP (ref 135–145)
SPECIMEN SOURCE: SIGNIFICANT CHANGE UP
WBC # BLD: 10.64 K/UL — HIGH (ref 3.8–10.5)
WBC # FLD AUTO: 10.64 K/UL — HIGH (ref 3.8–10.5)

## 2017-02-23 PROCEDURE — 88312 SPECIAL STAINS GROUP 1: CPT | Mod: 26

## 2017-02-23 PROCEDURE — 88305 TISSUE EXAM BY PATHOLOGIST: CPT | Mod: 26

## 2017-02-23 RX ADMIN — POLYETHYLENE GLYCOL 3350 17 GRAM(S): 17 POWDER, FOR SOLUTION ORAL at 21:58

## 2017-02-23 RX ADMIN — Medication 100 MILLIGRAM(S): at 21:58

## 2017-02-23 RX ADMIN — LISINOPRIL 2.5 MILLIGRAM(S): 2.5 TABLET ORAL at 05:08

## 2017-02-23 RX ADMIN — PANTOPRAZOLE SODIUM 40 MILLIGRAM(S): 20 TABLET, DELAYED RELEASE ORAL at 05:08

## 2017-02-23 RX ADMIN — SENNA PLUS 2 TABLET(S): 8.6 TABLET ORAL at 21:58

## 2017-02-23 RX ADMIN — Medication 100 MILLIGRAM(S): at 05:08

## 2017-02-23 RX ADMIN — Medication 15 MILLIGRAM(S): at 22:50

## 2017-02-23 RX ADMIN — CEFTRIAXONE 100 GRAM(S): 500 INJECTION, POWDER, FOR SOLUTION INTRAMUSCULAR; INTRAVENOUS at 21:57

## 2017-02-23 RX ADMIN — Medication 25 MILLIGRAM(S): at 05:08

## 2017-02-23 RX ADMIN — Medication 15 MILLIGRAM(S): at 05:31

## 2017-02-23 RX ADMIN — Medication 25 MILLIGRAM(S): at 21:58

## 2017-02-23 NOTE — DIETITIAN INITIAL EVALUATION ADULT. - OTHER INFO
pt seen for consult for assessment. pt reports he has not been weighed recently but does not feel his clothing was fitting differently at home either. Noted pt's lowest wt during previous admission as per previous RD note from November 2016 was about 218 pounds and admit wt now of 213 pounds- a few pounds lower. pt reports NKFA. States he was taking multivitamin, and vitamin D at home. pt reports he stopped taking his vitamin C due to feeling indigestion and pain, states he also stopped taking his Plaxil on Christmas morning since it was affecting his appetite and causing him to have a metallic taste. pt reports he has not been having too many sources of animal protein either due to change in taste. Reports lately he has been having foods such as puddings, yogurt and gelatin at times. pt reports he is quadriplegic and does depend on his spouse for almost everything. Reports he was taking Glimepiride at home and his fasting Finger sticks are usually around 117-130. States since he has been having the pain with eating he has been avoiding spicy and fried foods.

## 2017-02-23 NOTE — DIETITIAN INITIAL EVALUATION ADULT. - NS AS NUTRI INTERV ED CONTENT
Discussed common gastric irritants c pt to avoid due to concerns for indigestion and reflux; noted pt pending EGD, discussed c pt, RD available to make further recommendations pending results. Discussed importance of adequate intake & consuming protein w/ all meals and snacks for optimal nutrition & glycemic control.

## 2017-02-23 NOTE — DIETITIAN INITIAL EVALUATION ADULT. - ORAL INTAKE PTA
fair/pt reports he usually has a good appetite but for the past several months, he has had some pain c eating so his portion sizes have decreased over time; typically meals are: breakfast: Lactaid milk c some chocolate syrup, eggs and toast; lunch: soup-which spouse will puree for him; dinner: various foods- takes mostly protein, starch and vegetables

## 2017-02-23 NOTE — DIETITIAN INITIAL EVALUATION ADULT. - ADHERENCE
consistent CHO diet; reports he tries to limit concentrated sweets but does "cheat" at times; reports spouse does not cook c salt at home and pt does not add salt at the table, at times will eat take out Chinese foods/fair

## 2017-02-23 NOTE — DIETITIAN INITIAL EVALUATION ADULT. - PROBLEM SELECTOR PLAN 1
+UA a/w weakness, dec UOP. R hydronephrosis noted on CT and US.  -ceftriaxone 1g iv qd  -f/u ucx  -barton catheter in place

## 2017-02-23 NOTE — DIETITIAN INITIAL EVALUATION ADULT. - FACTORS AFF FOOD INTAKE
pt reports he didn't have too much of an appetite when he first came in but since then it has improved; reports he has not had any breakfast today due to plan for EGD later today; pt reports no dentures, has some broken teeth which he needs to see a dentist for but hasn't been able to of late due to MS and weather conditions; pt denies any swallowing issues; reports he usually has BMs about twice weekly, has not had a BM since admission

## 2017-02-23 NOTE — DIETITIAN INITIAL EVALUATION ADULT. - PHYSICAL APPEARANCE
well nourished/nutrition focused physical exam conducted; pt c mild muscle loss around shoulders- however, pt was not in the optimal position for nutrition focused physical exam; no muscle loss noted around temples; no fat loss noted except for slight loss around orbital region

## 2017-02-23 NOTE — DIETITIAN INITIAL EVALUATION ADULT. - PROBLEM SELECTOR PLAN 6
lovenox  verify home meds in am    #Coronary artery disease  -self discontinued plavix in december  -c/w aspirin  -intolerance to lipitor, no longer taking  -requires outpt cardiology follow up

## 2017-02-24 LAB — SURGICAL PATHOLOGY STUDY: SIGNIFICANT CHANGE UP

## 2017-02-24 PROCEDURE — 76775 US EXAM ABDO BACK WALL LIM: CPT | Mod: 26

## 2017-02-24 RX ORDER — CIPROFLOXACIN LACTATE 400MG/40ML
250 VIAL (ML) INTRAVENOUS
Qty: 0 | Refills: 0 | Status: DISCONTINUED | OUTPATIENT
Start: 2017-02-24 | End: 2017-02-25

## 2017-02-24 RX ADMIN — Medication 100 MILLIGRAM(S): at 13:10

## 2017-02-24 RX ADMIN — Medication 100 MILLIGRAM(S): at 23:12

## 2017-02-24 RX ADMIN — Medication 1 TABLET(S): at 11:48

## 2017-02-24 RX ADMIN — Medication 25 MILLIGRAM(S): at 17:46

## 2017-02-24 RX ADMIN — Medication 1000 UNIT(S): at 11:47

## 2017-02-24 RX ADMIN — Medication 250 MILLIGRAM(S): at 17:46

## 2017-02-24 RX ADMIN — POLYETHYLENE GLYCOL 3350 17 GRAM(S): 17 POWDER, FOR SOLUTION ORAL at 11:48

## 2017-02-24 RX ADMIN — Medication 1: at 08:12

## 2017-02-24 RX ADMIN — Medication 15 MILLIGRAM(S): at 23:13

## 2017-02-24 RX ADMIN — Medication 100 MILLIGRAM(S): at 05:46

## 2017-02-24 RX ADMIN — Medication 15 MILLIGRAM(S): at 05:46

## 2017-02-24 RX ADMIN — SENNA PLUS 2 TABLET(S): 8.6 TABLET ORAL at 23:12

## 2017-02-24 RX ADMIN — PANTOPRAZOLE SODIUM 40 MILLIGRAM(S): 20 TABLET, DELAYED RELEASE ORAL at 05:46

## 2017-02-24 RX ADMIN — LISINOPRIL 2.5 MILLIGRAM(S): 2.5 TABLET ORAL at 05:47

## 2017-02-24 RX ADMIN — Medication 25 MILLIGRAM(S): at 05:46

## 2017-02-24 RX ADMIN — ENOXAPARIN SODIUM 40 MILLIGRAM(S): 100 INJECTION SUBCUTANEOUS at 11:48

## 2017-02-24 RX ADMIN — Medication 1: at 17:46

## 2017-02-24 RX ADMIN — Medication 81 MILLIGRAM(S): at 11:47

## 2017-02-25 VITALS — HEART RATE: 70 BPM | SYSTOLIC BLOOD PRESSURE: 144 MMHG | DIASTOLIC BLOOD PRESSURE: 76 MMHG

## 2017-02-25 PROCEDURE — 76775 US EXAM ABDO BACK WALL LIM: CPT

## 2017-02-25 PROCEDURE — 82803 BLOOD GASES ANY COMBINATION: CPT

## 2017-02-25 PROCEDURE — 87798 DETECT AGENT NOS DNA AMP: CPT

## 2017-02-25 PROCEDURE — 99285 EMERGENCY DEPT VISIT HI MDM: CPT | Mod: 25

## 2017-02-25 PROCEDURE — 96375 TX/PRO/DX INJ NEW DRUG ADDON: CPT | Mod: XU

## 2017-02-25 PROCEDURE — 83036 HEMOGLOBIN GLYCOSYLATED A1C: CPT

## 2017-02-25 PROCEDURE — 80048 BASIC METABOLIC PNL TOTAL CA: CPT

## 2017-02-25 PROCEDURE — 36000 PLACE NEEDLE IN VEIN: CPT | Mod: LT,XU

## 2017-02-25 PROCEDURE — 88305 TISSUE EXAM BY PATHOLOGIST: CPT

## 2017-02-25 PROCEDURE — 87633 RESP VIRUS 12-25 TARGETS: CPT

## 2017-02-25 PROCEDURE — 82435 ASSAY OF BLOOD CHLORIDE: CPT

## 2017-02-25 PROCEDURE — 81001 URINALYSIS AUTO W/SCOPE: CPT

## 2017-02-25 PROCEDURE — 87581 M.PNEUMON DNA AMP PROBE: CPT

## 2017-02-25 PROCEDURE — 85730 THROMBOPLASTIN TIME PARTIAL: CPT

## 2017-02-25 PROCEDURE — 74177 CT ABD & PELVIS W/CONTRAST: CPT

## 2017-02-25 PROCEDURE — 85610 PROTHROMBIN TIME: CPT

## 2017-02-25 PROCEDURE — 87086 URINE CULTURE/COLONY COUNT: CPT

## 2017-02-25 PROCEDURE — 85014 HEMATOCRIT: CPT

## 2017-02-25 PROCEDURE — 84295 ASSAY OF SERUM SODIUM: CPT

## 2017-02-25 PROCEDURE — 83605 ASSAY OF LACTIC ACID: CPT

## 2017-02-25 PROCEDURE — 82330 ASSAY OF CALCIUM: CPT

## 2017-02-25 PROCEDURE — 87040 BLOOD CULTURE FOR BACTERIA: CPT

## 2017-02-25 PROCEDURE — 96374 THER/PROPH/DIAG INJ IV PUSH: CPT | Mod: XU

## 2017-02-25 PROCEDURE — 88312 SPECIAL STAINS GROUP 1: CPT

## 2017-02-25 PROCEDURE — 76700 US EXAM ABDOM COMPLETE: CPT

## 2017-02-25 PROCEDURE — 71045 X-RAY EXAM CHEST 1 VIEW: CPT

## 2017-02-25 PROCEDURE — 76937 US GUIDE VASCULAR ACCESS: CPT

## 2017-02-25 PROCEDURE — 80053 COMPREHEN METABOLIC PANEL: CPT

## 2017-02-25 PROCEDURE — 85027 COMPLETE CBC AUTOMATED: CPT

## 2017-02-25 PROCEDURE — 87186 SC STD MICRODIL/AGAR DIL: CPT

## 2017-02-25 PROCEDURE — 82947 ASSAY GLUCOSE BLOOD QUANT: CPT

## 2017-02-25 PROCEDURE — 93005 ELECTROCARDIOGRAM TRACING: CPT

## 2017-02-25 PROCEDURE — 87486 CHLMYD PNEUM DNA AMP PROBE: CPT

## 2017-02-25 PROCEDURE — 84132 ASSAY OF SERUM POTASSIUM: CPT

## 2017-02-25 RX ORDER — BACLOFEN 100 %
1.5 POWDER (GRAM) MISCELLANEOUS
Qty: 135 | Refills: 0 | OUTPATIENT
Start: 2017-02-25 | End: 2017-03-27

## 2017-02-25 RX ORDER — PANTOPRAZOLE SODIUM 20 MG/1
1 TABLET, DELAYED RELEASE ORAL
Qty: 30 | Refills: 0 | OUTPATIENT
Start: 2017-02-25 | End: 2017-03-27

## 2017-02-25 RX ORDER — CIPROFLOXACIN LACTATE 400MG/40ML
1 VIAL (ML) INTRAVENOUS
Qty: 0 | Refills: 0 | COMMUNITY
Start: 2017-02-25

## 2017-02-25 RX ORDER — CIPROFLOXACIN LACTATE 400MG/40ML
1 VIAL (ML) INTRAVENOUS
Qty: 4 | Refills: 0 | OUTPATIENT
Start: 2017-02-25 | End: 2017-02-27

## 2017-02-25 RX ADMIN — Medication 15 MILLIGRAM(S): at 07:31

## 2017-02-25 RX ADMIN — ENOXAPARIN SODIUM 40 MILLIGRAM(S): 100 INJECTION SUBCUTANEOUS at 12:31

## 2017-02-25 RX ADMIN — Medication 100 MILLIGRAM(S): at 05:07

## 2017-02-25 RX ADMIN — Medication 25 MILLIGRAM(S): at 05:08

## 2017-02-25 RX ADMIN — POLYETHYLENE GLYCOL 3350 17 GRAM(S): 17 POWDER, FOR SOLUTION ORAL at 12:31

## 2017-02-25 RX ADMIN — Medication 1 TABLET(S): at 12:31

## 2017-02-25 RX ADMIN — Medication 250 MILLIGRAM(S): at 05:08

## 2017-02-25 RX ADMIN — Medication 1: at 12:30

## 2017-02-25 RX ADMIN — Medication 1000 UNIT(S): at 12:31

## 2017-02-25 RX ADMIN — PANTOPRAZOLE SODIUM 40 MILLIGRAM(S): 20 TABLET, DELAYED RELEASE ORAL at 05:08

## 2017-02-25 RX ADMIN — LISINOPRIL 2.5 MILLIGRAM(S): 2.5 TABLET ORAL at 05:08

## 2017-02-25 RX ADMIN — Medication 250 MILLIGRAM(S): at 17:07

## 2017-02-25 RX ADMIN — Medication 100 MILLIGRAM(S): at 14:55

## 2017-02-25 RX ADMIN — Medication 81 MILLIGRAM(S): at 12:31

## 2017-02-25 RX ADMIN — Medication 25 MILLIGRAM(S): at 17:07

## 2017-02-25 NOTE — PROVIDER CONTACT NOTE (MEDICATION) - ACTION/TREATMENT ORDERED:
Ok not cover, pt is being discharged and not eating dinner, no insulin coverage. Educated patient, notified family.

## 2017-02-26 LAB
CULTURE RESULTS: SIGNIFICANT CHANGE UP
CULTURE RESULTS: SIGNIFICANT CHANGE UP
SPECIMEN SOURCE: SIGNIFICANT CHANGE UP
SPECIMEN SOURCE: SIGNIFICANT CHANGE UP

## 2017-12-29 ENCOUNTER — EMERGENCY (EMERGENCY)
Facility: HOSPITAL | Age: 72
LOS: 1 days | End: 2017-12-29
Attending: EMERGENCY MEDICINE
Payer: MEDICARE

## 2017-12-29 VITALS — HEART RATE: 30 BPM | DIASTOLIC BLOOD PRESSURE: 26 MMHG | SYSTOLIC BLOOD PRESSURE: 38 MMHG

## 2017-12-29 PROCEDURE — 99285 EMERGENCY DEPT VISIT HI MDM: CPT

## 2017-12-29 NOTE — ED PROVIDER NOTE - PHYSICAL EXAMINATION
Gen: no response, no spontaneous breathing  Head: NCAT  HEENT: pupils nonreactive, oral mucosa pale  Lung: no spontaneous breathing  CV: no pulse  Abd: soft, ND  MSK: no visible deformities  Neuro: unable to complete   Skin: No rash

## 2017-12-29 NOTE — ED PROVIDER NOTE - OBJECTIVE STATEMENT
73yo M with advanced MS and DM, BIBEMS for unresponsive, per EMS agonal breathing, hr in 30-40s, BP unobtainable. Wife en route, gave living will for no life sustaining treatments.

## 2017-12-29 NOTE — ED PROVIDER NOTE - ATTENDING CONTRIBUTION TO CARE
I performed a history and physical exam of the patient and discussed their management with the resident. I reviewed the resident's note and agree with the documented findings and plan of care.    71 yo M w/ charted history of DM, HTN, advanced MS, wheelchair bound, brought in by EMS for unresponsiveness. Patient evaluated by Dr. Rodriguez upon patient's arrival. Patient found to be severely bradycardic and hypotensive with agonal breathing. EMS provided Dr. Rodriguez with patient's living will (given to them by patient's wife) which explicitly states that patient wants the following life-sustaining procedures to be withheld or withdrawn including: Surgery, antibiotics, cardiac resuscitation, respiratory support, artificially administered feeding and fluid. Subsequently, patient was not resuscitated in accordance with wishes of his living will. Upon my seeing the patient, patient had passed and was pulseless.

## 2017-12-29 NOTE — ED ADULT NURSE NOTE - OBJECTIVE STATEMENT
72 year old male presents to ED via EMS 72 year old male presents to ED via EMS c/o "not speaking".  Patient was unresponsive, nonverbal, no pulse and not breathing. EMS arrived with living will form.  Wife on her way. Patient arrived with indwelling Hirsch catheter in place. Placed on cardiac monitor in [facility]. time of death 0708. 72 year old male presents to ED via EMS c/o "not speaking".  Patient was unresponsive, nonverbal, no pulse and not breathing. EMS arrived with living will form.  Wife on her way. Patient arrived with indwelling Hirsch catheter in place. Placed on cardiac monitor in [facility]A. time of death 0708. [facility] called confirmation # [id number] (Milka), patient not eligible for organ donation.

## 2017-12-29 NOTE — ED PROVIDER NOTE - FAMILY HISTORY
Family history of colon cancer in mother     Father  Still living? Unknown  Family history of cerebrovascular accident (CVA) in father, Age at diagnosis: Age Unknown

## 2017-12-29 NOTE — ED PROVIDER NOTE - MEDICAL DECISION MAKING DETAILS
patient initially seen by overnight team, IMER with agonal breathing and Hr in 30s, initially had BP in 30, has living will given to EMS by wife with request for DNR/DNI. Pt has advanced MS and wheelchair bound dependant for all ADLs. Per initial team, comfort measures only. Personally seen patient at 7:10 patient without pulse or breathing, time of death 7:11 due to cardiopulmonary arrest.

## 2018-04-11 NOTE — PATIENT PROFILE ADULT. - HAS THE PATIENT HAD A SIGNIFICANT CHANGE IN FUNCTIONAL STATUS DUE TO CVA, HEAD TRAUMA, ORTHOPEDIC TRAUMA/SURGERY, OR FALL, WITH THE WEEK PRIOR TO ADMISSION
Was the patient seen in the last year in this department? Yes     Does patient have an active prescription for medications requested? Yes     Received Request Via: Patient    
no

## 2021-03-25 NOTE — H&P ADULT. - PROBLEM SELECTOR PLAN 4
[Negative] : Allergic/Immunologic [FreeTextEntry2] : Poor appetite [FreeTextEntry8] : nocturia x 203 x [FreeTextEntry9] : Pain to left leg and thigh pain humalog sliding scale  hold home oral hypoglycemics  monitor fs tidac
